# Patient Record
Sex: FEMALE | Race: WHITE | NOT HISPANIC OR LATINO | URBAN - METROPOLITAN AREA
[De-identification: names, ages, dates, MRNs, and addresses within clinical notes are randomized per-mention and may not be internally consistent; named-entity substitution may affect disease eponyms.]

---

## 2022-05-10 ENCOUNTER — OFFICE VISIT (OUTPATIENT)
Dept: URBAN - METROPOLITAN AREA CLINIC 44 | Facility: CLINIC | Age: 47
End: 2022-05-10

## 2022-10-03 ENCOUNTER — OFFICE VISIT (OUTPATIENT)
Dept: URBAN - METROPOLITAN AREA CLINIC 48 | Facility: CLINIC | Age: 47
End: 2022-10-03

## 2022-10-11 ENCOUNTER — OFFICE VISIT (OUTPATIENT)
Dept: URBAN - METROPOLITAN AREA CLINIC 48 | Facility: CLINIC | Age: 47
End: 2022-10-11
Payer: COMMERCIAL

## 2022-10-11 VITALS
BODY MASS INDEX: 21.68 KG/M2 | HEIGHT: 64 IN | SYSTOLIC BLOOD PRESSURE: 117 MMHG | HEART RATE: 75 BPM | TEMPERATURE: 99.1 F | DIASTOLIC BLOOD PRESSURE: 78 MMHG | WEIGHT: 127 LBS

## 2022-10-11 DIAGNOSIS — R19.8 TENESMUS: ICD-10-CM

## 2022-10-11 DIAGNOSIS — Z12.11 COLON CANCER SCREENING: ICD-10-CM

## 2022-10-11 DIAGNOSIS — R10.84 ABDOMINAL CRAMPING, GENERALIZED: ICD-10-CM

## 2022-10-11 DIAGNOSIS — K59.09 CHRONIC CONSTIPATION: ICD-10-CM

## 2022-10-11 PROCEDURE — 99204 OFFICE O/P NEW MOD 45 MIN: CPT | Performed by: INTERNAL MEDICINE

## 2022-10-11 RX ORDER — ALBUTEROL SULFATE 108 UG/1
INHALANT RESPIRATORY (INHALATION)
Qty: 6.7 GRAM | Status: ACTIVE | COMMUNITY

## 2022-10-11 RX ORDER — METHADONE HYDROCHLORIDE 40 MG/1
1 TABLET IN 4 OUNCES OF WATER OR JUICE TABLET ORAL ONCE A DAY
Status: ACTIVE | COMMUNITY

## 2022-10-11 NOTE — HPI-TODAY'S VISIT:
48 y/o female presents to discuss chronic GI distress which has been worsening since February. She has had constipation since childhood. She usually has a bowel movement every 2-5 days, and admits to sensation of incomplete evacuation and has also lost sense of urge to have a bowel movement. She denies blood in stool, but states with significant constipation, she has had tears resulting in bleeding; denies rectal pain. She had excessive diarrhea with Dulcolax and feels a brief trial of Miralax (over a 3-day period) worsened her constipation. Also feels fiber worsened constipation. She has a lot of gas and intermittent nausea. No epigastric pain or significant reflux. Weight has been stable. Her father passed away during the COVID-19 pandemic due to a tear in the bowels of uncertain etiology. She has a lot of anxiety which is often brought on by her GI distress. She has occasional diarrhea which is brought on by anxiety.

## 2022-10-11 NOTE — PHYSICAL EXAM GASTROINTESTINAL
Abdomen , soft, R lower abdominal tenderness, nondistended , no guarding or rigidity , no masses palpable , normal bowel sounds , Liver and Spleen , no hepatomegaly present , no hepatosplenomegaly , liver nontender , spleen not palpable

## 2022-12-07 ENCOUNTER — OFFICE VISIT (OUTPATIENT)
Dept: URBAN - METROPOLITAN AREA CLINIC 29 | Facility: CLINIC | Age: 47
End: 2022-12-07
Payer: COMMERCIAL

## 2022-12-07 ENCOUNTER — WEB ENCOUNTER (OUTPATIENT)
Dept: URBAN - METROPOLITAN AREA CLINIC 29 | Facility: CLINIC | Age: 47
End: 2022-12-07

## 2022-12-07 ENCOUNTER — DASHBOARD ENCOUNTERS (OUTPATIENT)
Age: 47
End: 2022-12-07

## 2022-12-07 VITALS
WEIGHT: 131 LBS | HEIGHT: 64 IN | SYSTOLIC BLOOD PRESSURE: 111 MMHG | DIASTOLIC BLOOD PRESSURE: 74 MMHG | BODY MASS INDEX: 22.36 KG/M2 | HEART RATE: 75 BPM

## 2022-12-07 DIAGNOSIS — Z12.11 SCREEN FOR COLON CANCER: ICD-10-CM

## 2022-12-07 DIAGNOSIS — R19.7 DIARRHEA, UNSPECIFIED TYPE: ICD-10-CM

## 2022-12-07 DIAGNOSIS — K59.04 CHRONIC IDIOPATHIC CONSTIPATION: ICD-10-CM

## 2022-12-07 PROCEDURE — 99204 OFFICE O/P NEW MOD 45 MIN: CPT | Performed by: INTERNAL MEDICINE

## 2022-12-07 RX ORDER — SODIUM, POTASSIUM,MAG SULFATES 17.5-3.13G
177ML SOLUTION, RECONSTITUTED, ORAL ORAL
Qty: 1 KIT | Refills: 0 | OUTPATIENT
Start: 2022-12-07 | End: 2022-12-08

## 2022-12-07 RX ORDER — METHADONE HYDROCHLORIDE 40 MG/1
1 TABLET IN 4 OUNCES OF WATER OR JUICE TABLET ORAL ONCE A DAY
Status: ACTIVE | COMMUNITY

## 2022-12-07 RX ORDER — ALBUTEROL SULFATE 108 UG/1
INHALANT RESPIRATORY (INHALATION)
Qty: 6.7 GRAM | Status: ACTIVE | COMMUNITY

## 2022-12-07 NOTE — HPI-TODAY'S VISIT:
Ms. Jackson is a 47-year-old woman who presents for symptoms of GI distress.  She states that she  has alternating bowel baits.  She has been typically constipated.  She was having a bowel movement every 4-5 days which has been chronic since she is on methadone.  She states about a month ago she developed diarrhea, she has a loose stool every 1-2 days.  She has excess gas, bloating but no nausea.  She has been having stool leakage.

## 2022-12-14 ENCOUNTER — LAB OUTSIDE AN ENCOUNTER (OUTPATIENT)
Dept: URBAN - METROPOLITAN AREA CLINIC 29 | Facility: CLINIC | Age: 47
End: 2022-12-14

## 2022-12-22 PROBLEM — 82934008 CHRONIC IDIOPATHIC CONSTIPATION: Status: ACTIVE | Noted: 2022-12-07

## 2023-02-28 NOTE — PHYSICAL EXAM HENT:
Pt is advised to monitor and document glucose fasting when you wake up before you eat and 2 hours after meal and bring in meter to next visit.      Ensure to take medications as directed.      Follow diabetic diet as directed.      Work to achieve normal body weight.     Ensure to exercise 4-5 times per week for 20 minutes.  Low Carbohydrate snacks/quick meals    Ham and cheese rollups - slice of ham wrapped around a string cheese/cheese slice. (0 gm carb)  Cucumber boats (stuffed with chicken salad or tuna salad - no fruit or sweet pickle in salad) (0 gm carb)  Celery and Peanut Butter (2 stalks with 1 Tbsp PNB = 6 gm carb)  Nuts - mixed (1/4 cup = 6 gm carb)  Sunflower seeds- without shell (1/4 cup = 7 gm carb) In the shell (1 cup = 3.3 gm carb)  Deviled eggs (no sweet pickles) - (0 gm carb)  Hard boiled eggs - (0 gm carb)  Guacamole with raw vegetables (mashed avocado with lime juice and seasoning to taste) (1 cup raw veg = 5 gm carb)  Ranch dressing with raw vegetables -(2 Tbsp Ranch = 2 gm carb, ½ cup raw veggies = 2.5 gm carb  Lasagna rolls- Slice zucchini thinly lengthwise and microwave for 1-2 minutes. Fill with ricotta, parmesan cheese. Roll and top with low carb tomato sauce. (5 rolls = 5 gm carb)  Crust less pizza -pepperoni or Irish corona topped with cheese and veggies +1 tbsp tomato sauce, microwave (1 gm carb)  Beef jerky - read label for lower carb jerky  Olives - Black (5 olives = 1 gm carb) Green (5 olives = 1 gm carb)  Dill pickles (1 whole dill pickle = 2 gm carb)  Sugar free jell-o (0 gm carb)  [place] Chicken Marinate chicken strips in 2 Tbsp sugar free maple syrup, 1 Tbsp lime juice, 1 Tbsp fresh cilantro. [place] or cook in skillet sprayed with oil. (0 gm carb)  Chicken nachos - Heat oven to 350. Rub 5-6 chicken tenders with 1 Tbsp Shadi Taco seasoning then drizzle with vegetable oil. Bake at 350 for 3-4 min and then flip and cook 3-4 min. Top with grated cheese, jalopenos, corona, green  Head,  normocephalic,  atraumatic,  Face,  Face within normal limits,  Ears,  External ears within normal limits,  Nose/Nasopharynx,  External nose  normal appearance,  nares patent,  no nasal discharge,  Mouth and Throat,  Oral cavity appearance normal,  Breath odor normal,  Lips,  Appearance normal onion. Place back in oven at temp of 425 for 3-4 minutes. Serve with side of 2 Tbsp ranch dressing or sour cream. (3 gm carb)  Egg Mcmuffin: using egg (or egg white for a healthier version) as the bread put one slice of cheese with 1 slice [place] corona or sausage rusty (1 gm carb per sandwich)  Southern Okra - Wash and dry fresh okra. Toss with olive oil, salt and pepper and roast in oven 10-20 minutes. (1 cup = 5 gm carb)  Crispy green beans - Leslie 5 lbs fresh green beans. Toss with 1/3 cup melted coconut oil and sprinkle with 4 tsp salt and 1 tsp onion and garlic powder. Bake at 170-175 for 8 hours or dehydrate overnight in . (1 cup = 5 gm carb)  Salt and vinegar zucchini chips - Thinly slice zucchini as thin as possible. In small bowl whisk 2 Tbsp olive oil, 2 Tbsp white vinegar, and 2 tsp sea salt. Add zucchini and dehydrate or bake on 170 for 3-4 hours till crispy. (½ cup = 3 gm carb). Make in large batches and keep in air tight container up to 1 week   Zucchini Shadi chips - Thinly slice zucchini as thin as possible. Heat oil in fryer to 350 and drop zucchini in hot oil working in batches of about 20 chips at a time. Remove, drain and sprinkle with Shadi Taco seasoning. (1/2 cup = 3 gm carb). Make in large batches and keep in air tight container up to 1 week.  Shadi Taco Seasoning - 2 Tbsp chili powder, ½ tsp garlic powder, ½ tsp onion powder, ½ tsp crushed red pepper flakes, ½ tsp dried oregano, 3 tsp ground cumin, 2 tsp sea salt, 2 tsp black pepper. Makes 20 servings (0 gm carb)  [place] milk (1 cup almond milk = 0.3 gm carb)  Cheese chips - Preheat oven 400. On a nonstick baking sheet add cheese slices (Cheddar, Swiss, Provolone, Pascual [place]), bake 10-12 minutes or until browned. Cool completely before removal. (2 slices = 1 gm carb). Store in air tight container up to 1 week.   Breakfast balls - mix together 2 lbs pork sausage, 1 lb ground beef, 3 eggs, 2 Tbsp dried onion flakes, ½ tsp black  pepper, ½ lb sharp cheddar cheese, shredded. Form into 4 dozen 1 balls. Bake on cookie sheet for 25 min at 375. Cool and may be frozen as well individually. (0 gm carb)  Meat muffins - spray muffin tin with cooking spray. Line muffin tin with 1 slice ham. Add 1 raw egg. Top with grated cheese and salt and pepper. Bake 10-12 min. (0 gm carb)  Pork rinds/Cracklings (0 gm carb)  Gummy Bears - Add 1 packet of Sugar free jello (flavor of your choice), 1 packet unflavored gelatin and 1/3 cup cold water to small sauce pan. Stir well and heat over low till it become liquid and dissolved (2-3 minutes). Pour into mold and refrigerate 30-40 minutes. Add only ¼ cup if you want them more firm. (0 gm carb)  Cheese and meat kabobs (0 gm carb)  Garlic parmesan cheese crisps - Preheat oven to 350. On a nonstick baking sheet, place 1 Tbsp grated parmesan cheese, sprinkle with garlic and basil. Bake about 5 minutes or until outside edges become lin brown. Cool completely before removal (5 crisps = 1 gm carb)  Antipasti - Pepperoni, salami, green pepper, jalapeno pepper, mushrooms, onion, black olives, cheddar and provolone cheese cubes. Toss with Italian salad dressing. (1/2 cup = 5 gm carb)  Clinch chicken wings - (0 gm carb)  Cheetos- preheat oven to 300. Chop ½ cup freshly shredded cheddar cheese that is frozen and place in  or  and chop into tiny pieces. In a mixing bowl, whip 3 egg whites and 1/8 tsp cream of tartar until VERY stiff peaks form. Sprinkle cheese on top of egg whites and then fold cheese into egg whites very carefully. Place mixture into large ziplock bag and cut hole in corner. Gently squeeze onto greased nonstick cookie pan in cheestos like shapes. Sprinkle with parmesan cheese. Bake for 30 minutes. Turn over off and leave in there for another 30 minutes. (1 cup = 1 gm carb)  Cheesy cauliflower tots - preheat oven to 400. Spray mini muffin tin with nonstick spray. Chop ½ large head of  cauliflower into small pieces. Place in microwavable bowl and cover. Microwave 2 minutes. Drain cauliflower. Place in  and pulse till finely chopped. To this add, 1/3 cup grated sharp cheddar, ¼ cup grated parmesan cheese, 2 Tbsp. almond flour, ¼ tsp salt, ½ tsp all purpose seasoning and 1 egg. Mix well. Place 1 Tbsp of mixture in each mini muffin tin. Bake 15 minutes. Remove and flip, bake another 15 minutes. Makes 24 tots. (10 tots = 5 gm carb)  [employer] protein bars (carbs vary)  [place] snacks - Preheat oven 350. 1 cup shredded Pepperjack paper. Scoop 1 Tbsp cheese. Place on non stick pan and press slightly flat. Top with 1 slice jalapeno pepper. Bake for 10-12 minutes till brown and crispy. Cool completely before removal. (10 crisps = 1 gm carb)  Snake bite (aka jalapeno poppers) - remove seeds and membrane from jalapenos, fill with cream cheese, wrap in corona. Stick a toothpick through to hold corona in place. Bake 15-20 min at 400 (4 bites = 2 gm carb)  5 minute PNB mousse - Beat together 4 oz softened cream cheese, 2 Tbsp natural PNB (no sugar added), ½ tsp vanilla extract and 1/3 cup Splenda. Fold in 1 cup Reddiwhip. Place in container of your choice and refrigerate up to 1 week. Top your serving with 1Tbsp Sugar free chocolate syrup. (1/2 cup = 4 gm carb)  BLT - Fill a leaf of wallace lettuce leaf with 1 Tbsp LF freitas, 2 slices corona, sliced tomato. Sprinkle salt and pepper. (0 gm carb)  Cinnamon and coconut bombs - in microwave safe bowl, mix 1 cup coconut butter, 1 cup coconut milk (full fat canned, not from box), 1 tsp vanilla extract, ½ tsp nutmeg and cinnamon, 1 tsp stevia powder extract. Melt until combined. Place bowl in fridge until hard enough to roll into 10-12 balls, about 30 minutes. Roll balls in 1 cup shredded coconut. Store in refrigerator (1 ball = 1 gm carb)